# Patient Record
Sex: FEMALE | Race: WHITE | NOT HISPANIC OR LATINO | Employment: OTHER | URBAN - NONMETROPOLITAN AREA
[De-identification: names, ages, dates, MRNs, and addresses within clinical notes are randomized per-mention and may not be internally consistent; named-entity substitution may affect disease eponyms.]

---

## 2018-05-11 ENCOUNTER — TRANSCRIBE ORDERS (OUTPATIENT)
Dept: MRI IMAGING | Facility: HOSPITAL | Age: 53
End: 2018-05-11

## 2018-05-11 DIAGNOSIS — G89.29 CHRONIC BILATERAL THORACIC BACK PAIN: Primary | ICD-10-CM

## 2018-05-11 DIAGNOSIS — M54.2 NECK PAIN: ICD-10-CM

## 2018-05-11 DIAGNOSIS — M54.6 CHRONIC BILATERAL THORACIC BACK PAIN: Primary | ICD-10-CM

## 2018-05-28 ENCOUNTER — APPOINTMENT (OUTPATIENT)
Dept: MRI IMAGING | Facility: HOSPITAL | Age: 53
End: 2018-05-28

## 2019-02-11 ENCOUNTER — OFFICE VISIT (OUTPATIENT)
Dept: GASTROENTEROLOGY | Facility: CLINIC | Age: 54
End: 2019-02-11

## 2019-02-11 ENCOUNTER — LAB (OUTPATIENT)
Dept: LAB | Facility: HOSPITAL | Age: 54
End: 2019-02-11

## 2019-02-11 VITALS
SYSTOLIC BLOOD PRESSURE: 104 MMHG | BODY MASS INDEX: 29.52 KG/M2 | HEART RATE: 89 BPM | HEIGHT: 65 IN | DIASTOLIC BLOOD PRESSURE: 64 MMHG | WEIGHT: 177.2 LBS

## 2019-02-11 DIAGNOSIS — R68.81 EARLY SATIETY: ICD-10-CM

## 2019-02-11 DIAGNOSIS — R11.2 NAUSEA AND VOMITING, INTRACTABILITY OF VOMITING NOT SPECIFIED, UNSPECIFIED VOMITING TYPE: ICD-10-CM

## 2019-02-11 DIAGNOSIS — R63.4 WEIGHT LOSS: ICD-10-CM

## 2019-02-11 DIAGNOSIS — R11.0 NAUSEA: ICD-10-CM

## 2019-02-11 DIAGNOSIS — R10.13 EPIGASTRIC PAIN: Primary | ICD-10-CM

## 2019-02-11 PROCEDURE — 84443 ASSAY THYROID STIM HORMONE: CPT

## 2019-02-11 PROCEDURE — 84479 ASSAY OF THYROID (T3 OR T4): CPT

## 2019-02-11 PROCEDURE — 99203 OFFICE O/P NEW LOW 30 MIN: CPT | Performed by: INTERNAL MEDICINE

## 2019-02-11 PROCEDURE — 36415 COLL VENOUS BLD VENIPUNCTURE: CPT

## 2019-02-11 PROCEDURE — 84436 ASSAY OF TOTAL THYROXINE: CPT

## 2019-02-11 RX ORDER — POTASSIUM CHLORIDE 20 MEQ/1
20 TABLET, EXTENDED RELEASE ORAL 2 TIMES DAILY
COMMUNITY

## 2019-02-11 RX ORDER — QUETIAPINE FUMARATE 100 MG/1
100 TABLET, FILM COATED ORAL NIGHTLY
COMMUNITY

## 2019-02-11 RX ORDER — ATORVASTATIN CALCIUM 10 MG/1
10 TABLET, FILM COATED ORAL DAILY
COMMUNITY

## 2019-02-11 RX ORDER — HYDROXYZINE HYDROCHLORIDE 10 MG/1
10 TABLET, FILM COATED ORAL 3 TIMES DAILY PRN
COMMUNITY

## 2019-02-11 RX ORDER — ROPINIROLE 0.5 MG/1
0.5 TABLET, FILM COATED ORAL NIGHTLY
COMMUNITY

## 2019-02-11 RX ORDER — FLUOXETINE HYDROCHLORIDE 20 MG/1
20 CAPSULE ORAL DAILY
COMMUNITY

## 2019-02-11 NOTE — PROGRESS NOTES
[facility]      Chief Complaint:   Chief Complaint   Patient presents with   • Nausea   • Decreased appetite       Subjective     HPI:   Nausea.  Onset: 6 months.  Weight loss.   From 220 pounds to 177 pounds over six months.  Appetite poor.  Nausea intermittent after eating.   Vomiting sometimes.  Abdominal pain in epigastric occurs after eating.  Onset about 2 months.   Feels full quickly.  Denies constipation, bowel movement every 2 - 3 days. Denies diarrhea.    Smokes about 1 and half pack daily.   Does not drink alcohol.    PLAN:  CT abdomen, pelvis, chest.  TSH  GES.  EGD and colonoscopy after review of CT scan.  High fiber diet.    Past History:   Past Medical History:   Diagnosis Date   • Anxiety      Past Surgical History:   Procedure Laterality Date   • BACK SURGERY     • HYSTERECTOMY         Family History:  Family History   Problem Relation Age of Onset   • Alcohol abuse Father    • Alcohol abuse Sister        Social History:   reports that she has been smoking cigarettes.  she has never used smokeless tobacco. She reports that she does not drink alcohol or use drugs.    Medications:   Prior to Admission medications    Medication Sig Start Date End Date Taking? Authorizing Provider   atorvastatin (LIPITOR) 10 MG tablet Take 10 mg by mouth Daily.   Yes Jorge Cortez MD   FLUoxetine (PROzac) 20 MG capsule Take 20 mg by mouth Daily.   Yes Jorge Cortez MD   hydrOXYzine (ATARAX) 10 MG tablet Take 10 mg by mouth 3 (Three) Times a Day As Needed for Itching.   Yes Jorge Cortez MD   potassium chloride (K-DUR,KLOR-CON) 20 MEQ CR tablet Take 20 mEq by mouth 2 (Two) Times a Day.   Yes Jorge Cortez MD   QUEtiapine (SEROquel) 100 MG tablet Take 100 mg by mouth Every Night.   Yes Jorge Cortez MD   rOPINIRole (REQUIP) 0.5 MG tablet Take 0.5 mg by mouth Every Night. Take 1 hour before bedtime.   Yes Jorge Cortez MD       Allergies:  Bactrim  "[sulfamethoxazole-trimethoprim]; Penicillins; and Carbidopa-levodopa    ROS:    Review of Systems   Constitutional: Positive for unexpected weight change. Negative for chills and fever.   HENT: Negative for trouble swallowing.    Respiratory: Negative for shortness of breath and wheezing.    Cardiovascular: Negative for chest pain and leg swelling.   Gastrointestinal: Positive for abdominal pain, nausea and vomiting. Negative for abdominal distention, blood in stool, constipation and diarrhea.   Musculoskeletal: Negative for neck pain and neck stiffness.   Skin: Negative for color change.   Neurological: Negative for seizures and speech difficulty.   Psychiatric/Behavioral: Negative for confusion and hallucinations.   *Objective     Blood pressure 104/64, pulse 89, height 165.1 cm (65\"), weight 80.4 kg (177 lb 3.2 oz).    Physical Exam   Constitutional: She is oriented to person, place, and time. No distress.   HENT:   Head: Normocephalic and atraumatic.   Eyes: Right eye exhibits no discharge. Left eye exhibits no discharge.   Neck: Normal range of motion. Neck supple.   Cardiovascular: Normal rate and regular rhythm.   Pulmonary/Chest: Effort normal and breath sounds normal.   Abdominal: Soft. Bowel sounds are normal. There is no tenderness.   Neurological: She is alert and oriented to person, place, and time.   Skin: Skin is warm. She is not diaphoretic.   Psychiatric: Her behavior is normal. Thought content normal.        Laboratory Data:   No results for input(s): GLUCOSE, BUN, CREATININE, NA, K, CL, CO2, CALCIUM, PROTEINTOT, ALBUMIN, ALT, AST, ALKPHOS, BILITOT, EGFRIFNONA, GLOB, AGRATIO, BCR, ANIONGAP, BILIDIR, INDBILI in the last 35865 hours.  No results for input(s): WBC, RBC, HGB, HCT, MCV, MCH, MCHC, RDW, RDWSD, MPV, PLT in the last 20990 hours.  No results for input(s): INR in the last 17277 hours.  No results for input(s): CRP in the last 77553 hours.  No results for input(s): LIPASE in the last 39295 " hours.  No results for input(s): AMYLASE in the last 74106 hours.    No radiology results for the last 30 days.    Assessment/Plan   Chitra was seen today for nausea and decreased appetite.    Diagnoses and all orders for this visit:    Epigastric pain  -     CT Abdomen Pelvis With Contrast    Weight loss  -     Thyroid Panel With TSH; Future  -     CT Abdomen Pelvis With Contrast  -     NM Gastric Emptying; Future  -     CT Chest With Contrast; Future    Nausea  -     NM Gastric Emptying; Future    Nausea and vomiting, intractability of vomiting not specified, unspecified vomiting type  -     NM Gastric Emptying; Future    Early satiety  -     NM Gastric Emptying; Future        * Surgery not found *     Diagnosis Plan   1. Epigastric pain  CT Abdomen Pelvis With Contrast   2. Weight loss  Thyroid Panel With TSH    CT Abdomen Pelvis With Contrast    NM Gastric Emptying    CT Chest With Contrast   3. Nausea  NM Gastric Emptying   4. Nausea and vomiting, intractability of vomiting not specified, unspecified vomiting type  NM Gastric Emptying   5. Early satiety  NM Gastric Emptying       Anticipated Surgical Procedure:  Orders Placed This Encounter   Procedures   • CT Abdomen Pelvis With Contrast     Order Specific Question:   Will Oral Contrast be needed for this procedure?     Answer:   Yes   • NM Gastric Emptying     Standing Status:   Future     Standing Expiration Date:   2/11/2020     Order Specific Question:   Reason for Exam:     Answer:   early satiety, nausea, vomiting     Order Specific Question:   Is the patient breastfeeding?     Answer:   No   • CT Chest With Contrast     Standing Status:   Future     Standing Expiration Date:   2/11/2020   • Thyroid Panel With TSH     Standing Status:   Future     Number of Occurrences:   1     Standing Expiration Date:   2/11/2020       I discussed the assessment and recommendations with the patient. She verbalized understanding. All questions were answered. The patient  denies any further question.    Aderemi Jaiyeola, MD  02/17/19  3:21 PM    EMR Dragon/Transcription disclaimer:   Some of this note may be an electronic transcription/translation of spoken language to printed text. The electronic translation of spoken language may permit erroneous, or at times, nonsensical words or phrases to be inadvertently transcribed; Although I have reviewed the note for such errors, some may still exist.

## 2019-02-13 LAB
FT4I SERPL CALC-MCNC: 1.3 (ref 1.2–4.9)
T3RU NFR SERPL: 22 % (ref 24–39)
T4 SERPL-MCNC: 5.9 UG/DL (ref 4.5–12)
TSH SERPL-ACNC: 3.72 UIU/ML (ref 0.45–4.5)

## 2019-02-19 ENCOUNTER — HOSPITAL ENCOUNTER (OUTPATIENT)
Dept: CT IMAGING | Facility: HOSPITAL | Age: 54
Discharge: HOME | End: 2019-02-19
Admitting: INTERNAL MEDICINE

## 2019-02-19 DIAGNOSIS — R63.4 WEIGHT LOSS: ICD-10-CM

## 2019-02-19 PROCEDURE — 25010000002 IOPAMIDOL 61 % SOLUTION: Performed by: INTERNAL MEDICINE

## 2019-02-19 PROCEDURE — 74177 CT ABD & PELVIS W/CONTRAST: CPT

## 2019-02-19 PROCEDURE — 71260 CT THORAX DX C+: CPT

## 2019-02-19 RX ADMIN — IOPAMIDOL 90 ML: 612 INJECTION, SOLUTION INTRAVENOUS at 14:45

## 2019-02-26 ENCOUNTER — HOSPITAL ENCOUNTER (OUTPATIENT)
Dept: NUCLEAR MEDICINE | Facility: HOSPITAL | Age: 54
Discharge: HOME | End: 2019-02-26

## 2019-02-26 DIAGNOSIS — R63.4 WEIGHT LOSS: ICD-10-CM

## 2019-02-26 DIAGNOSIS — R11.0 NAUSEA: ICD-10-CM

## 2019-02-26 DIAGNOSIS — R68.81 EARLY SATIETY: ICD-10-CM

## 2019-02-26 DIAGNOSIS — R11.2 NAUSEA AND VOMITING, INTRACTABILITY OF VOMITING NOT SPECIFIED, UNSPECIFIED VOMITING TYPE: ICD-10-CM

## 2019-02-26 PROCEDURE — A9541 TC99M SULFUR COLLOID: HCPCS | Performed by: INTERNAL MEDICINE

## 2019-02-26 PROCEDURE — 0 TECHNETIUM SULFUR COLLOID: Performed by: INTERNAL MEDICINE

## 2019-02-26 PROCEDURE — 78264 GASTRIC EMPTYING IMG STUDY: CPT

## 2019-02-26 RX ADMIN — TECHNETIUM TC 99M SULFUR COLLOID 1 DOSE: KIT at 07:11

## 2019-03-06 ENCOUNTER — HOSPITAL ENCOUNTER (OUTPATIENT)
Facility: HOSPITAL | Age: 54
Setting detail: HOSPITAL OUTPATIENT SURGERY
End: 2019-03-06
Attending: INTERNAL MEDICINE | Admitting: INTERNAL MEDICINE

## 2019-03-06 ENCOUNTER — OFFICE VISIT (OUTPATIENT)
Dept: GASTROENTEROLOGY | Facility: CLINIC | Age: 54
End: 2019-03-06

## 2019-03-06 VITALS
WEIGHT: 173.6 LBS | HEART RATE: 75 BPM | DIASTOLIC BLOOD PRESSURE: 66 MMHG | OXYGEN SATURATION: 99 % | HEIGHT: 65 IN | BODY MASS INDEX: 28.92 KG/M2 | SYSTOLIC BLOOD PRESSURE: 112 MMHG

## 2019-03-06 DIAGNOSIS — Z12.11 COLON CANCER SCREENING: ICD-10-CM

## 2019-03-06 DIAGNOSIS — R68.81 EARLY SATIETY: ICD-10-CM

## 2019-03-06 DIAGNOSIS — R11.2 NAUSEA AND VOMITING, INTRACTABILITY OF VOMITING NOT SPECIFIED, UNSPECIFIED VOMITING TYPE: Primary | ICD-10-CM

## 2019-03-06 DIAGNOSIS — R63.4 WEIGHT LOSS: ICD-10-CM

## 2019-03-06 DIAGNOSIS — R10.13 EPIGASTRIC PAIN: ICD-10-CM

## 2019-03-06 PROCEDURE — 99214 OFFICE O/P EST MOD 30 MIN: CPT | Performed by: INTERNAL MEDICINE

## 2019-03-06 RX ORDER — DEXTROSE AND SODIUM CHLORIDE 5; .45 G/100ML; G/100ML
30 INJECTION, SOLUTION INTRAVENOUS CONTINUOUS PRN
Status: CANCELLED | OUTPATIENT
Start: 2019-03-06

## 2019-03-06 NOTE — PATIENT INSTRUCTIONS

## 2019-03-06 NOTE — PROGRESS NOTES
[facility]      Chief Complaint:   Chief Complaint   Patient presents with   • Nausea   • decreased appetite       Subjective     HPI:   Weight loss. Onset: 6 months.  From 220 pounds to 177 pounds over six months.  Appetite poor.  Nausea intermittent after eating.   Vomiting sometimes.  Abdominal pain in epigastric occurs after eating.  Onset about 2 months.   Feels full quickly.  Denies constipation, bowel movement every 2 - 3 days. Denies diarrhea.  Patient takes ibuprofen for chronic back pain.    Gastric Emptying scan: IMPRESSION:  Solid gastric half-emptying time is 57 minutes, which is within normal limits.    Plan:  EGD and colonoscopy. EGD with duodenal biopsy to evaluate the nausea, vomiting, epigastric pain, feeling full quickly and weight loss. Colonoscopy to evaluate the weight loss and screen for colon cancer.  Ultrasound RUQ to evaluate the gallbladder and bile duct.      Past History:   Past Medical History:   Diagnosis Date   • Anxiety      Past Surgical History:   Procedure Laterality Date   • BACK SURGERY     • HYSTERECTOMY         Family History:  Family History   Problem Relation Age of Onset   • Alcohol abuse Father    • Alcohol abuse Sister        Social History:   reports that she has been smoking cigarettes.  she has never used smokeless tobacco. She reports that she does not drink alcohol or use drugs.    Medications:   Prior to Admission medications    Medication Sig Start Date End Date Taking? Authorizing Provider   atorvastatin (LIPITOR) 10 MG tablet Take 10 mg by mouth Daily.   Yes Jorge Cortez MD   FLUoxetine (PROzac) 20 MG capsule Take 20 mg by mouth Daily.   Yes Jorge Cortez MD   hydrOXYzine (ATARAX) 10 MG tablet Take 10 mg by mouth 3 (Three) Times a Day As Needed for Itching.   Yes Jorge Cortez MD   potassium chloride (K-DUR,KLOR-CON) 20 MEQ CR tablet Take 20 mEq by mouth 2 (Two) Times a Day.   Yes Jorge Cortez MD  "  QUEtiapine (SEROquel) 100 MG tablet Take 100 mg by mouth Every Night.   Yes Provider, MD Jorge   rOPINIRole (REQUIP) 0.5 MG tablet Take 0.5 mg by mouth Every Night. Take 1 hour before bedtime.   Yes Provider, MD Jorge       Allergies:  Bactrim [sulfamethoxazole-trimethoprim]; Penicillins; and Carbidopa-levodopa    ROS:    Review of Systems   Constitutional: Positive for unexpected weight change.   HENT: Negative for trouble swallowing and voice change.    Gastrointestinal: Positive for abdominal pain, nausea and vomiting. Negative for abdominal distention, blood in stool, constipation and diarrhea.        Feeling full quickly. Poor appetite.   Musculoskeletal: Negative for neck pain and neck stiffness.   Skin: Negative for color change.   Neurological: Negative for seizures and speech difficulty.   Hematological: Negative for adenopathy.   Psychiatric/Behavioral: Negative for confusion and hallucinations.     Objective     Blood pressure 112/66, pulse 75, height 165.1 cm (65\"), weight 78.7 kg (173 lb 9.6 oz), SpO2 99 %.    Physical Exam   Constitutional: She is oriented to person, place, and time. No distress.   HENT:   Head: Normocephalic and atraumatic.   Eyes: Right eye exhibits no discharge. Left eye exhibits no discharge.   Neck: Neck supple. No tracheal deviation present.   Cardiovascular: Normal rate and regular rhythm.   Pulmonary/Chest: Effort normal and breath sounds normal.   Abdominal: Soft. Bowel sounds are normal. She exhibits no distension and no mass. There is tenderness. There is no rebound and no guarding.   Tenderness in epigastric   Musculoskeletal: She exhibits no edema.   Neurological: She is alert and oriented to person, place, and time.   Skin: Skin is warm. She is not diaphoretic.   Psychiatric: Her behavior is normal. Thought content normal.        Laboratory Data:   No results for input(s): GLUCOSE, BUN, CREATININE, NA, K, CL, CO2, CALCIUM, PROTEINTOT, ALBUMIN, ALT, AST, " ALKPHOS, BILITOT, EGFRIFNONA, GLOB, AGRATIO, BCR, ANIONGAP, BILIDIR, INDBILI in the last 45006 hours.  No results for input(s): WBC, RBC, HGB, HCT, MCV, MCH, MCHC, RDW, RDWSD, MPV, PLT in the last 33027 hours.  No results for input(s): INR in the last 22516 hours.  No results for input(s): CRP in the last 45466 hours.  No results for input(s): LIPASE in the last 55405 hours.  No results for input(s): AMYLASE in the last 05255 hours.    Ct Chest With Contrast    Result Date: 2/20/2019  CONCLUSION: Unremarkable CT chest. Degenerative changes lumbar spine. Posterior fusion L5-S1. CT abdomen, pelvis with contrast is otherwise unremarkable. Electronically signed by:  Luis Alberto Lorenz MD  2/20/2019 12:05 PM CST Workstation: [employer] Gastric Emptying    Result Date: 2/26/2019  Solid gastric half-emptying time is 57 minutes, which is within normal limits. Electronically signed by:  Michael Delvalle MD  2/26/2019 10:49 AM CST Workstation: [id number]    Ct Abdomen Pelvis With Contrast    Result Date: 2/20/2019  CONCLUSION: Unremarkable CT chest. Degenerative changes lumbar spine. Posterior fusion L5-S1. CT abdomen, pelvis with contrast is otherwise unremarkable. Electronically signed by:  Luis Alberto Lorenz MD  2/20/2019 12:05 PM CST Workstation: [id number]      Assessment/Plan   Chitra was seen today for nausea and decreased appetite.    Diagnoses and all orders for this visit:    Nausea and vomiting, intractability of vomiting not specified, unspecified vomiting type  -     US Abdomen Complete; Future    Weight loss  -     Case Request; Standing  -     Implement Anesthesia Orders Day of Procedure; Standing  -     Obtain Informed Consent; Standing  -     Verify Bowel Prep Was Successful; Standing  -     Give Tap Water Enema If Bowel Prep Insufficient; Standing  -     Hold Medication patient denies taking blood thinner; Standing  -     dextrose 5 % and sodium chloride 0.45 % infusion; Infuse 30 mL/hr into a venous catheter Continuous  As Needed (Start Prior to Procedure).  -     Case Request    Epigastric pain  -     US Abdomen Complete; Future    Early satiety    Colon cancer screening  -     polyethylene glycol (GoLYTELY) 236 g solution; Starting at noon on day prior to procedure, drink 8 ounces every 30 minutes until all gone or stools are clear. May add flavor packet.  -     Case Request; Standing  -     Implement Anesthesia Orders Day of Procedure; Standing  -     Obtain Informed Consent; Standing  -     Verify Bowel Prep Was Successful; Standing  -     Give Tap Water Enema If Bowel Prep Insufficient; Standing  -     Hold Medication patient denies taking blood thinner; Standing  -     dextrose 5 % and sodium chloride 0.45 % infusion; Infuse 30 mL/hr into a venous catheter Continuous As Needed (Start Prior to Procedure).  -     Case Request        ESOPHAGOGASTRODUODENOSCOPY (N/A), COLONOSCOPY (N/A)     Diagnosis Plan   1. Nausea and vomiting, intractability of vomiting not specified, unspecified vomiting type  US Abdomen Complete   2. Weight loss  Case Request    Implement Anesthesia Orders Day of Procedure    Obtain Informed Consent    Verify Bowel Prep Was Successful    Give Tap Water Enema If Bowel Prep Insufficient    Hold Medication patient denies taking blood thinner    dextrose 5 % and sodium chloride 0.45 % infusion    Case Request   3. Epigastric pain  US Abdomen Complete   4. Early satiety     5. Colon cancer screening  polyethylene glycol (GoLYTELY) 236 g solution    Case Request    Implement Anesthesia Orders Day of Procedure    Obtain Informed Consent    Verify Bowel Prep Was Successful    Give Tap Water Enema If Bowel Prep Insufficient    Hold Medication patient denies taking blood thinner    dextrose 5 % and sodium chloride 0.45 % infusion    Case Request       Anticipated Surgical Procedure:  Orders Placed This Encounter   Procedures   • US Abdomen Complete     Standing Status:   Future     Standing Expiration Date:   3/6/2020      Order Specific Question:   Reason for Exam:     Answer:   nausea, vomiting, epigastric pain. Evaluate gallbladder and bile duct.     I discussed the assessment and recommendations with the patient. She verbalized understanding. All questions were answered. The patient denies any further question.    Aderemi Jaiyeola, MD  03/06/19  3:51 PM    EMR Dragon/Transcription disclaimer:   Some of this note may be an electronic transcription/translation of spoken language to printed text. The electronic translation of spoken language may permit erroneous, or at times, nonsensical words or phrases to be inadvertently transcribed; Although I have reviewed the note for such errors, some may still exist.

## 2019-03-18 ENCOUNTER — APPOINTMENT (OUTPATIENT)
Dept: ULTRASOUND IMAGING | Facility: HOSPITAL | Age: 54
End: 2019-03-18

## 2021-08-19 ENCOUNTER — TELEPHONE (OUTPATIENT)
Dept: GENERAL RADIOLOGY | Facility: HOSPITAL | Age: 56
End: 2021-08-19

## 2021-08-19 NOTE — TELEPHONE ENCOUNTER
PT WAS A NO SHOW FOR CT APPT ON [id number] CALLED AND TALKED TO BABAR FROM OFFICE   Unknown if ever smoked

## 2022-12-28 ENCOUNTER — TRANSCRIBE ORDERS (OUTPATIENT)
Dept: PODIATRY | Facility: CLINIC | Age: 57
End: 2022-12-28

## 2022-12-28 DIAGNOSIS — B07.0 PLANTAR WART OF LEFT FOOT: Primary | ICD-10-CM
